# Patient Record
Sex: FEMALE | Race: WHITE
[De-identification: names, ages, dates, MRNs, and addresses within clinical notes are randomized per-mention and may not be internally consistent; named-entity substitution may affect disease eponyms.]

---

## 2019-11-05 ENCOUNTER — HOSPITAL ENCOUNTER (OUTPATIENT)
Dept: HOSPITAL 74 - FASU | Age: 24
Discharge: HOME | End: 2019-11-05
Attending: PODIATRIST
Payer: COMMERCIAL

## 2019-11-05 VITALS — SYSTOLIC BLOOD PRESSURE: 117 MMHG | DIASTOLIC BLOOD PRESSURE: 72 MMHG | HEART RATE: 75 BPM

## 2019-11-05 VITALS — TEMPERATURE: 97.9 F

## 2019-11-05 VITALS — BODY MASS INDEX: 20.7 KG/M2

## 2019-11-05 DIAGNOSIS — M21.612: ICD-10-CM

## 2019-11-05 DIAGNOSIS — M21.621: ICD-10-CM

## 2019-11-05 DIAGNOSIS — M21.622: ICD-10-CM

## 2019-11-05 DIAGNOSIS — M21.6X1: ICD-10-CM

## 2019-11-05 DIAGNOSIS — M21.611: Primary | ICD-10-CM

## 2019-11-05 PROCEDURE — 0QSP04Z REPOSITION LEFT METATARSAL WITH INTERNAL FIXATION DEVICE, OPEN APPROACH: ICD-10-PCS | Performed by: PODIATRIST

## 2019-11-05 PROCEDURE — 0QSN04Z REPOSITION RIGHT METATARSAL WITH INTERNAL FIXATION DEVICE, OPEN APPROACH: ICD-10-PCS | Performed by: PODIATRIST

## 2019-11-07 NOTE — OP
DATE OF OPERATION:  11/05/2019

 

SURGEON:  Levar Powers DPM

 

PREOPERATIVE DIAGNOSIS:  Bilateral bunions, bilateral tailor's bunion and plantar

flexed metatarsal number 2, right. 

 

POSTOPERATIVE DIAGNOSIS:  Bilateral bunions, bilateral tailor's bunion and plantar

flexed metatarsal number 2, right.

 

PROCEDURES PERFORMED:  Clyde bunionectomy, right; Clyde and Pedro Pablo bunionectomy,

left; tailor's bunionectomy, bilateral; and 2nd metatarsal Weil osteotomy, right.  

 

COMPLICATIONS:  None.

 

ESTIMATED BLOOD LOSS:  Minimal.  An ankle tourniquet was utilized. 

 

DESCRIPTION OF PROCEDURE:  The patient was prepped and draped in the usual sterile

manner.  Attention was first given to the right foot, where, under tourniquet,

attention was directed to the 1st MPJ.  A medial incision approach was made.  It was

carried down to the level of the joint capsule, carefully preserving neurovascular

structures, clamping and ligating or Bovieing bleeders as necessary.  A linear

capsulotomy was performed.  The capsular and ligamentous structures attached to the

head of the 1st metatarsal were dissected free.

 

The joint was inspected.  The hypertrophic dorsomedial eminence was removed with the

sagittal saw.  A V-shaped osteotomy was performed in the metatarsal metaphyseal

region.  The capital fragment was translocated laterally and held in place with a

K-wire.  Intraoperative fluoroscopy indicated good positioning.  The fragment was

then fixated with a single 2.4 screw.  

 

Following this, attention was directed to the 2nd metatarsophalangeal joint of the

right foot, where a Weil osteotomy was performed using a dorsal incision and again,

through all procedures, carefully preserving neurovascular structures and Bovieing

bleeders where needed.  

 

Following the linear capsulotomy of the dorsal joint, the Weil osteotomy was

performed with sagittal saw.  The capital fragment was translocated proximally and

fixated with a screw.  It was first K-wired and positioned and under fluoroscopy

noted to sit in a corrected position pre and post, as were the bunion and other

procedures throughout the procedure.  Fluoroscopy was used for all procedures.  

 

Attention was then directed to the 5th metatarsophalangeal joint, where a collateral

incision was performed, carried down to the level of the joint capsule.  Capsular

incision was performed.  The hypertrophic bone was excised.  It was confirmed.  It

was rasped smooth as well.  Again, with the bunion and this procedure, all excess

bone was rongeuered and rasped smooth.  The excess bone from the Weil was also

removed with a rongeur.  There was noted to be excellent correction.

 

Now all closure was performed with 2-0 Vicryl for capsule.  This was after _____

carefully and thoroughly flushing with sterile saline.  Deep closure subcutaneous was

then performed with 4-0 Vicryl, and skin with 5-0 nylon.  This was for all locations

on the foot.

 

Attention then was directed to the left foot, where the bunionectomy and tailor's

bunionectomy were performed, the same as was performed on the right foot. 

Additionally, the hallux was more abducted and an Pedro Pablo osteotomy was performed in

this area, with the hinge left intact, using the sagittal saw and a 10-mm staple in

position and flush with the bone was put in place.  Excellent correction was

achieved.  Again, fluoroscopy concurred with these findings.  

 

A postoperative block was given on both feet.  

 

The patient tolerated the procedures and anesthesia well, and left the operating room

with vascular status intact to all digits and sterile bandages applied.  

 

The patient was seen in Recovery and I also spoke with the patient this morning, a

day later, and the patient was doing very well.  

 

 

JOSLYN DUNCAN/[id number]

DD: 11/06/2019 11:12

DT: 11/07/2019 07:47

Job #:  [id number]

## 2019-11-07 NOTE — PATH
Surgical Pathology Report



Patient Name:  IVIS CARTER

Accession #:  [id number]

Med. Rec. #:  [id number]                                                        

   /Age/Gender:  1995 (Age: 24) / F

Account:  [id number]                                                          

             Location: [facility] AMBUL[facility] 

Taken:  2019

Received:  2019

Reported:  2019

Physicians:  Levar Powers M.D.

  



Specimen(s) Received

 BONE BILATERAL FEET BUNION 





Clinical History

Bunion bilateral feet







Final Diagnosis

BONE, BILATERAL FEET BUNIONS, EXCISION:

FRAGMENTS OF BONE WITH FATTY MARROW SHOWING FOCAL DEGENERATIVE CHANGE.





***Electronically Signed***

Douglas Ku M.D.





Gross Description

Received in formalin labeled "bone bilateral feet bunions," are 5 tan-yellow,

irregular portions of bone ranging from 0.7 x 0.5 x 0.1 cm to 1.7 x 1.4 x 0.2

cm. Representative sections are submitted in one cassette, following

decalcification.

/2019



saudi

## 2020-10-31 ENCOUNTER — TRANSCRIPTION ENCOUNTER (OUTPATIENT)
Age: 25
End: 2020-10-31

## 2021-07-02 ENCOUNTER — TRANSCRIPTION ENCOUNTER (OUTPATIENT)
Age: 26
End: 2021-07-02

## 2021-12-22 ENCOUNTER — TRANSCRIPTION ENCOUNTER (OUTPATIENT)
Age: 26
End: 2021-12-22

## 2024-02-08 PROBLEM — Z00.00 ENCOUNTER FOR PREVENTIVE HEALTH EXAMINATION: Status: ACTIVE | Noted: 2024-02-08

## 2024-03-08 ENCOUNTER — APPOINTMENT (OUTPATIENT)
Dept: PEDIATRIC MEDICAL GENETICS | Facility: CLINIC | Age: 29
End: 2024-03-08
Payer: COMMERCIAL

## 2024-03-08 DIAGNOSIS — Z78.9 OTHER SPECIFIED HEALTH STATUS: ICD-10-CM

## 2024-03-08 DIAGNOSIS — Z83.42 FAMILY HISTORY OF FAMILIAL HYPERCHOLESTEROLEMIA: ICD-10-CM

## 2024-03-08 DIAGNOSIS — Z13.71 ENCOUNTER FOR NONPROCREATIVE SCREENING FOR GENETIC DISEASE CARRIER STATUS: ICD-10-CM

## 2024-03-08 DIAGNOSIS — Z80.42 FAMILY HISTORY OF MALIGNANT NEOPLASM OF PROSTATE: ICD-10-CM

## 2024-03-08 DIAGNOSIS — Z31.5 ENCOUNTER FOR PROCREATIVE GENETIC COUNSELING: ICD-10-CM

## 2024-03-08 PROCEDURE — 96040: CPT | Mod: 95

## 2024-03-08 RX ORDER — ELASTIC BANDAGE 2"X2.2YD
BANDAGE TOPICAL
Refills: 0 | Status: ACTIVE | COMMUNITY

## 2024-03-09 ENCOUNTER — NON-APPOINTMENT (OUTPATIENT)
Age: 29
End: 2024-03-09

## 2024-03-09 ENCOUNTER — TRANSCRIPTION ENCOUNTER (OUTPATIENT)
Age: 29
End: 2024-03-09

## 2024-04-01 ENCOUNTER — NON-APPOINTMENT (OUTPATIENT)
Age: 29
End: 2024-04-01

## 2024-08-09 ENCOUNTER — APPOINTMENT (OUTPATIENT)
Dept: MATERNAL FETAL MEDICINE | Facility: CLINIC | Age: 29
End: 2024-08-09

## 2024-08-09 ENCOUNTER — ASOB RESULT (OUTPATIENT)
Age: 29
End: 2024-08-09

## 2024-08-09 PROBLEM — O24.419 GESTATIONAL DIABETES: Status: ACTIVE | Noted: 2024-08-09

## 2024-08-09 PROCEDURE — G0109 DIAB MANAGE TRN IND/GROUP: CPT | Mod: 95

## 2024-08-19 ENCOUNTER — ASOB RESULT (OUTPATIENT)
Age: 29
End: 2024-08-19

## 2024-08-19 ENCOUNTER — APPOINTMENT (OUTPATIENT)
Dept: MATERNAL FETAL MEDICINE | Facility: CLINIC | Age: 29
End: 2024-08-19
Payer: COMMERCIAL

## 2024-08-19 PROCEDURE — G0108 DIAB MANAGE TRN  PER INDIV: CPT | Mod: 95

## 2024-08-30 ENCOUNTER — APPOINTMENT (OUTPATIENT)
Dept: MATERNAL FETAL MEDICINE | Facility: CLINIC | Age: 29
End: 2024-08-30
Payer: COMMERCIAL

## 2024-08-30 ENCOUNTER — ASOB RESULT (OUTPATIENT)
Age: 29
End: 2024-08-30

## 2024-08-30 PROCEDURE — G0108 DIAB MANAGE TRN  PER INDIV: CPT | Mod: 95

## 2024-09-09 ENCOUNTER — TRANSCRIPTION ENCOUNTER (OUTPATIENT)
Age: 29
End: 2024-09-09

## 2024-09-09 ENCOUNTER — INPATIENT (INPATIENT)
Facility: HOSPITAL | Age: 29
LOS: 1 days | Discharge: HOME | End: 2024-09-11
Attending: OBSTETRICS & GYNECOLOGY | Admitting: OBSTETRICS & GYNECOLOGY

## 2024-09-09 ENCOUNTER — ASOB RESULT (OUTPATIENT)
Age: 29
End: 2024-09-09

## 2024-09-09 ENCOUNTER — APPOINTMENT (OUTPATIENT)
Dept: ANTEPARTUM | Facility: CLINIC | Age: 29
End: 2024-09-09
Payer: COMMERCIAL

## 2024-09-09 VITALS
RESPIRATION RATE: 15 BRPM | HEART RATE: 81 BPM | DIASTOLIC BLOOD PRESSURE: 84 MMHG | SYSTOLIC BLOOD PRESSURE: 137 MMHG | TEMPERATURE: 99 F

## 2024-09-09 DIAGNOSIS — Z98.890 OTHER SPECIFIED POSTPROCEDURAL STATES: Chronic | ICD-10-CM

## 2024-09-09 DIAGNOSIS — O26.899 OTHER SPECIFIED PREGNANCY RELATED CONDITIONS, UNSPECIFIED TRIMESTER: ICD-10-CM

## 2024-09-09 DIAGNOSIS — O42.90 PREMATURE RUPTURE OF MEMBRANES, UNSPECIFIED AS TO LENGTH OF TIME BETWEEN RUPTURE AND ONSET OF LABOR, UNSPECIFIED WEEKS OF GESTATION: ICD-10-CM

## 2024-09-09 LAB
BASOPHILS # BLD AUTO: 0.03 K/UL — SIGNIFICANT CHANGE UP (ref 0–0.2)
BASOPHILS NFR BLD AUTO: 0.2 % — SIGNIFICANT CHANGE UP (ref 0–2)
BLD GP AB SCN SERPL QL: NEGATIVE — SIGNIFICANT CHANGE UP
EOSINOPHIL # BLD AUTO: 0.03 K/UL — SIGNIFICANT CHANGE UP (ref 0–0.5)
EOSINOPHIL NFR BLD AUTO: 0.2 % — SIGNIFICANT CHANGE UP (ref 0–6)
GLUCOSE BLDC GLUCOMTR-MCNC: 76 MG/DL — SIGNIFICANT CHANGE UP (ref 70–99)
GLUCOSE BLDC GLUCOMTR-MCNC: 89 MG/DL — SIGNIFICANT CHANGE UP (ref 70–99)
HCT VFR BLD CALC: 40 % — SIGNIFICANT CHANGE UP (ref 34.5–45)
HGB BLD-MCNC: 13.5 G/DL — SIGNIFICANT CHANGE UP (ref 11.5–15.5)
IANC: 10.4 K/UL — HIGH (ref 1.8–7.4)
IMM GRANULOCYTES NFR BLD AUTO: 0.5 % — SIGNIFICANT CHANGE UP (ref 0–0.9)
LYMPHOCYTES # BLD AUTO: 1.74 K/UL — SIGNIFICANT CHANGE UP (ref 1–3.3)
LYMPHOCYTES # BLD AUTO: 13.2 % — SIGNIFICANT CHANGE UP (ref 13–44)
MCHC RBC-ENTMCNC: 29.2 PG — SIGNIFICANT CHANGE UP (ref 27–34)
MCHC RBC-ENTMCNC: 33.8 GM/DL — SIGNIFICANT CHANGE UP (ref 32–36)
MCV RBC AUTO: 86.6 FL — SIGNIFICANT CHANGE UP (ref 80–100)
MONOCYTES # BLD AUTO: 0.9 K/UL — SIGNIFICANT CHANGE UP (ref 0–0.9)
MONOCYTES NFR BLD AUTO: 6.8 % — SIGNIFICANT CHANGE UP (ref 2–14)
NEUTROPHILS # BLD AUTO: 10.4 K/UL — HIGH (ref 1.8–7.4)
NEUTROPHILS NFR BLD AUTO: 79.1 % — HIGH (ref 43–77)
NRBC # BLD: 0 /100 WBCS — SIGNIFICANT CHANGE UP (ref 0–0)
NRBC # FLD: 0 K/UL — SIGNIFICANT CHANGE UP (ref 0–0)
PLATELET # BLD AUTO: 150 K/UL — SIGNIFICANT CHANGE UP (ref 150–400)
RBC # BLD: 4.62 M/UL — SIGNIFICANT CHANGE UP (ref 3.8–5.2)
RBC # FLD: 13.4 % — SIGNIFICANT CHANGE UP (ref 10.3–14.5)
RH IG SCN BLD-IMP: POSITIVE — SIGNIFICANT CHANGE UP
RH IG SCN BLD-IMP: POSITIVE — SIGNIFICANT CHANGE UP
WBC # BLD: 13.16 K/UL — HIGH (ref 3.8–10.5)
WBC # FLD AUTO: 13.16 K/UL — HIGH (ref 3.8–10.5)

## 2024-09-09 PROCEDURE — 76815 OB US LIMITED FETUS(S): CPT | Mod: 26

## 2024-09-09 RX ORDER — KETOROLAC TROMETHAMINE 30 MG/ML
30 INJECTION, SOLUTION INTRAMUSCULAR ONCE
Refills: 0 | Status: DISCONTINUED | OUTPATIENT
Start: 2024-09-09 | End: 2024-09-10

## 2024-09-09 RX ORDER — CHLORHEXIDINE GLUCONATE 40 MG/ML
1 SOLUTION TOPICAL DAILY
Refills: 0 | Status: DISCONTINUED | OUTPATIENT
Start: 2024-09-09 | End: 2024-09-10

## 2024-09-09 RX ORDER — VITAMIN A, ASCORBIC ACID, VITAMIN D, .ALPHA.-TOCOPHEROL, THIAMINE MONONITRATE, RIBOFLAVIN, NIACIN, PYRIDOXINE HYDROCHLORIDE, FOLIC ACID, CYANOCOBALAMIN, CALCIUM, IRON, MAGNESIUM, ZINC, AND COPPER 2700; 70; 400; 30; 1.6; 1.8; 18; 2.5; 1; 12; 100; 65; 25; 25; 2 [IU]/1; MG/1; [IU]/1; [IU]/1; MG/1; MG/1; MG/1; MG/1; MG/1; UG/1; MG/1; MG/1; MG/1; MG/1; MG/1
1 TABLET ORAL DAILY
Refills: 0 | Status: DISCONTINUED | OUTPATIENT
Start: 2024-09-09 | End: 2024-09-11

## 2024-09-09 RX ORDER — SODIUM CHLORIDE 9 MG/ML
3 INJECTION INTRAMUSCULAR; INTRAVENOUS; SUBCUTANEOUS EVERY 8 HOURS
Refills: 0 | Status: DISCONTINUED | OUTPATIENT
Start: 2024-09-09 | End: 2024-09-11

## 2024-09-09 RX ORDER — WITCH HAZEL 1 G/ML
1 LIQUID TOPICAL EVERY 4 HOURS
Refills: 0 | Status: DISCONTINUED | OUTPATIENT
Start: 2024-09-09 | End: 2024-09-11

## 2024-09-09 RX ORDER — PRAMOXINE HCL 1 %
1 GEL (GRAM) TOPICAL EVERY 4 HOURS
Refills: 0 | Status: DISCONTINUED | OUTPATIENT
Start: 2024-09-09 | End: 2024-09-11

## 2024-09-09 RX ORDER — TETANUS TOXOID, REDUCED DIPHTHERIA TOXOID AND ACELLULAR PERTUSSIS VACCINE, ADSORBED 5; 2.5; 8; 8; 2.5 [IU]/.5ML; [IU]/.5ML; UG/.5ML; UG/.5ML; UG/.5ML
0.5 SUSPENSION INTRAMUSCULAR ONCE
Refills: 0 | Status: DISCONTINUED | OUTPATIENT
Start: 2024-09-09 | End: 2024-09-11

## 2024-09-09 RX ORDER — IBUPROFEN 600 MG
600 TABLET ORAL EVERY 6 HOURS
Refills: 0 | Status: COMPLETED | OUTPATIENT
Start: 2024-09-09 | End: 2025-08-08

## 2024-09-09 RX ORDER — ACETAMINOPHEN 325 MG/1
975 TABLET ORAL
Refills: 0 | Status: DISCONTINUED | OUTPATIENT
Start: 2024-09-09 | End: 2024-09-11

## 2024-09-09 RX ORDER — OXYTOCIN 10 UNIT/ML
41.67 AMPUL (ML) INJECTION
Qty: 20 | Refills: 0 | Status: DISCONTINUED | OUTPATIENT
Start: 2024-09-09 | End: 2024-09-10

## 2024-09-09 RX ORDER — OXYTOCIN 10 UNIT/ML
333.33 AMPUL (ML) INJECTION
Qty: 20 | Refills: 0 | Status: DISCONTINUED | OUTPATIENT
Start: 2024-09-09 | End: 2024-09-10

## 2024-09-09 RX ORDER — MENTHOL/CETYLPYRD CL 3 MG
1 LOZENGE MUCOUS MEMBRANE EVERY 6 HOURS
Refills: 0 | Status: DISCONTINUED | OUTPATIENT
Start: 2024-09-09 | End: 2024-09-11

## 2024-09-09 RX ORDER — LANOLIN
1 OINTMENT (GRAM) TOPICAL EVERY 6 HOURS
Refills: 0 | Status: DISCONTINUED | OUTPATIENT
Start: 2024-09-09 | End: 2024-09-11

## 2024-09-09 RX ORDER — DIPHENHYDRAMINE HCL 50 MG
25 CAPSULE ORAL EVERY 6 HOURS
Refills: 0 | Status: DISCONTINUED | OUTPATIENT
Start: 2024-09-09 | End: 2024-09-11

## 2024-09-09 RX ORDER — OXYCODONE HYDROCHLORIDE 5 MG/1
5 TABLET ORAL ONCE
Refills: 0 | Status: DISCONTINUED | OUTPATIENT
Start: 2024-09-09 | End: 2024-09-11

## 2024-09-09 RX ORDER — HYDROCORTISONE 1 %
1 OINTMENT (GRAM) TOPICAL EVERY 6 HOURS
Refills: 0 | Status: DISCONTINUED | OUTPATIENT
Start: 2024-09-09 | End: 2024-09-11

## 2024-09-09 RX ORDER — OXYTOCIN 10 UNIT/ML
AMPUL (ML) INJECTION
Qty: 30 | Refills: 0 | Status: DISCONTINUED | OUTPATIENT
Start: 2024-09-09 | End: 2024-09-09

## 2024-09-09 RX ORDER — OXYCODONE HYDROCHLORIDE 5 MG/1
5 TABLET ORAL
Refills: 0 | Status: DISCONTINUED | OUTPATIENT
Start: 2024-09-09 | End: 2024-09-11

## 2024-09-09 RX ADMIN — Medication 125 MILLILITER(S): at 19:30

## 2024-09-09 RX ADMIN — Medication 2 MILLIUNIT(S)/MIN: at 19:30

## 2024-09-09 RX ADMIN — Medication 125 MILLILITER(S): at 18:41

## 2024-09-09 RX ADMIN — Medication 2 MILLIUNIT(S)/MIN: at 18:45

## 2024-09-09 RX ADMIN — CHLORHEXIDINE GLUCONATE 1 APPLICATION(S): 40 SOLUTION TOPICAL at 18:40

## 2024-09-09 NOTE — OB PROVIDER H&P - NSLOWPPHRISK_OBGYN_A_OB
No previous uterine incision/Chaney Pregnancy/Less than or equal to 4 previous vaginal births/No known bleeding disorder/No history of postpartum hemorrhage/No other PPH risks indicated

## 2024-09-09 NOTE — OB PROVIDER H&P - HISTORY OF PRESENT ILLNESS
28y/o  @39.1wks gestation presents with contraction pain 7/10 pain scale and leaking of clear fluid since 1am.  Denies vaginal bleeding.  Endorses +FM     PNC: Dr. Mobley- low risk   -GBS Negative 2024  -Late diagnosis of GDMA 1(fingersticks have been wnl)

## 2024-09-09 NOTE — OB PROVIDER TRIAGE NOTE - NSICDXPASTMEDICALHX_GEN_ALL_CORE_FT
PAST MEDICAL HISTORY:  History of bunionectomy     History of nasal septoplasty      PAST MEDICAL HISTORY:  No pertinent past medical history

## 2024-09-09 NOTE — OB PROVIDER TRIAGE NOTE - NSOBPROVIDERNOTE_OBGYN_ALL_OB_FT
28y/o  @39.1wks gestation admit for premature rupture of membranes     -Admit to L&D   -Routine labs   -IV fluids  -Epidural PRN   -For Pitocin   Blood transfusion and induction/labor consents obtained. Risks, benefits, alternatives and possible complications have been discussed in detail with the patient in her native language. Pre-admission, admission, and post admission procedures and expectations were discussed in detail. All questions answered, all appropriate hospital consents were signed. Anticipate normal vaginal delivery. Informed Consent was obtained. The following was discussed:     Induction/Augmentation of Labor: Use of medication to begin or enhance labor  Obstetrical management including internal fetal/contraction monitoring  Normal vaginal delivery  Possible  Section: (surgical cut in the abdomen in order to deliver the baby)    D/W Dr. Chery   D/W Dr. Zeb Bae, NP

## 2024-09-09 NOTE — OB RN DELIVERY SUMMARY - NS_PLACENTDISPOA_OBGYN_ALL_OB
1930: Verbal shift change report given to [address] [place] [address] (oncoming nurse) by Judi Pisano, RN (offgoing nurse). Report included the following information SBAR, Kardex, Intake/Output, MAR, Recent Results, Cardiac Rhythm NSR and Alarm Parameters . 0500: Notified Rk Doran NP of patients potassium of 6.1, Magnesium of 3.0, and Phosphorus of 4.8    0540: RN x 2 at bedside attempting to perform incontinent care. Upon rolling the patient, o2 sats dropped into the 30's; Patient became bradycardic with HR in the 30's and hypotensive with SBP in the 30's. Rk Doran NP at bedside. 0730: Bedside and Verbal shift change report given to Kevon Jolley RN (oncoming nurse) by Judi Pisano RN (offgoing nurse). Report included the following information SBAR, Kardex, Intake/Output, MAR, Recent Results, Cardiac Rhythm NSR/SB and Alarm Parameters . Discarded in the usual manner

## 2024-09-09 NOTE — OB RN DELIVERY SUMMARY - NSSELHIDDEN_OBGYN_ALL_OB_FT
[NS_DeliveryAttending1_OBGYN_ALL_OB_FT:[id number]==],[NS_DeliveryRN_OBGYN_ALL_OB_FT:[id number]],[NS_CirculateRN2_OBGYN_ALL_OB_FT:[id number]=]

## 2024-09-09 NOTE — OB PROVIDER TRIAGE NOTE - NSHPPHYSICALEXAM_GEN_ALL_CORE
T(C): 37.3 (09-09-24 @ 15:04), Max: 37.3 (09-09-24 @ 15:04)  HR: 100 (09-09-24 @ 16:37) (81 - 100)  BP: 130/77 (09-09-24 @ 16:37) (121/66 - 137/84)  RR: 15 (09-09-24 @ 15:04) (15 - 15)    Physical Exam  Gen: NAD  Cardiac: RRR  Pulm: Unlabored, breathing comfortably on room air  Abdomen: soft, nontender, gravid    TAUS: cephalic, Posterior placenta, MVP: 2.74,  bpm via m-mode, images saved in ASOB  SSE: Positive pooling, Positive Nitrazine, Positive Fern, Cervix appears dilated   VE:2.5/60/-3  EFM:  Level Green:

## 2024-09-09 NOTE — OB PROVIDER TRIAGE NOTE - HISTORY OF PRESENT ILLNESS
30y/o  @39.1wks gestation presents with contraction pain 7/10 pain scale and leaking of clear fluid since 1am.  Denies vaginal bleeding.  Endorses +FM     PNC: Dr. Mobley- low risk   -GBS Negative 2024  -Late diagnosis of GDMA 1(fingersticks have been wnl)

## 2024-09-09 NOTE — OB PROVIDER IHI INDUCTION/AUGMENTATION NOTE - LABOR: FETAL STATION
Psychiatric Assessment    Patient: Levon Snowden Date: 2024   : 1982 Attending: No att. providers found   41 year old female        This visit is being performed virtually via Video visit using [employer].   Clinical Location: Home  Levon's location Home and is physically present in   the [facility] at the time of this visit.        Chief complaint: \"I feel frustrated that I can't work.\"    History of Presenting Illness: Ms. Snowden is a 41 year old -American female who is seen today for her initial psychiatric evaluation with this provider. She reports history of depression and anxiety for which she is currently taking Zoloft, Buspar and Hydroxyzine as needed. Denies having any significant side effects from her medications. She feels \"frustrated\" about her inability to work due to leg injury and surgery with another pending procedure. She reportedly loves to work but has been prevented by injury in the past years. She moved to [place] 5 years ago from [place] where she grew up. Reports sleeping \"well\" with the help of her medications. Mood is \"better but still depressed.\" She enjoys watching football. Reports crying spells but vehemently denied suicidal thoughts or intent. Appetite, concentration and energy levels are decreased.  Denies psychomotor agitation or retardation. Denies history of auditory or visual hallucinations.    Patient denies history of inpatient psychiatric hospitalizations. Denies past suicide attempts or self-injurious behaviors. Denies any significant childhood abuse or trauma. Reports that depressions \"runs in my family.\" Denies alcohol or illicit substance abuse.    Past Psych History: As noted in the history of present illness including AODA loss of control and trauma.       Psychiatric ROS:   Bipolar disorder: The patient denies having grandiose mood, elevated energy, being awake for extended periods of time without the need for sleep, excessive talking,  impulsive behavior, hypersexual behavior, racing thoughts and mood swings.   Obsessive compulsive: The patient denies having repetitive thoughts, repetitive visions, hoarding behavior, repetitive checking and uses of rituals behaviors.   Post-traumatic stress disorder: The patient denies having trauma that has caused nightmares, hypervigilant behavior, emotional numbing, flashbacks, avoidant behavior, anger, and dissociation.   Panic attacks/Generalized anxiety disorder: The patient reports having nervousness, constant worrying, muscle tension, anger, key up, problems with sleep caused by anxiety, shortness of breath, palpitations, chest tightness, hot/cold flashes, sweating, shakiness, and feeling of impending doom.     Past Medical and Current Status:   The following were reviewed in the electronic record as past history and active problems:  Active Ambulatory Problems     Diagnosis Date Noted    Anxiety 05/10/2021    Obesity (BMI 30-39.9) 05/10/2021    Tobacco use 05/10/2021    Prediabetes 05/11/2021    Low vitamin D level 05/11/2021    History of iron deficiency anemia 07/21/2022    Neuralgia of left saphenous nerve 11/28/2022    Neuropathic pain 11/28/2022    Seizure-like activity (CMD) 10/18/2023     Resolved Ambulatory Problems     Diagnosis Date Noted    No Resolved Ambulatory Problems     Past Medical History:   Diagnosis Date    Asthma     Back pain     Panic attacks      Patient Active Problem List   Diagnosis    Anxiety    Obesity (BMI 30-39.9)    Tobacco use    Prediabetes    Low vitamin D level    History of iron deficiency anemia    Neuralgia of left saphenous nerve    Neuropathic pain    Seizure-like activity (CMD)       Medications at Assessment: Prior to assessment medications were reviewed in the electronic record.  Current Outpatient Medications on File Prior to Visit   Medication Sig Dispense Refill    hydrOXYzine (ATARAX) 25 MG tablet Take 1 tablet by mouth every 8 hours as needed for Anxiety.  Do not drive or drink alcohol as the medication can cause sedation. 30 tablet 0    [DISCONTINUED] busPIRone (BUSPAR) 7.5 MG tablet Take 1 tablet by mouth in the morning and 1 tablet in the evening. NO FURTHER REFILLS UNTIL SEEN BY  tablet 1    [DISCONTINUED] sertraline (ZOLOFT) 100 MG tablet Take 1 tablet by mouth daily. 90 tablet 1    solifenacin (VESICARE) 10 MG tablet Take 1 tablet (10 mg total) by mouth once daily. 90 tablet 3     Current Facility-Administered Medications on File Prior to Visit   Medication    levonorgestrel (MIRENA) (52 MG) 20 MCG/DAY intrauterine device 52 mg       Family History (Psych and pertinent Med):  Reviewed and updated in the electronic record.  Family diseases/traits and treatment: See hx.    Social History:    Social History     Tobacco Use    Smoking status: Every Day     Current packs/day: 0.50     Average packs/day: 0.5 packs/day for 0.6 years (0.3 ttl pk-yrs)     Types: Cigarettes     Start date: 6/1/2023    Smokeless tobacco: Never    Tobacco comments:     1/2 pk per day. Patient has a longer smoking history. She had quit but re-started in June 2023. Quit smoking 09/16/2023, vaping   Vaping Use    Vaping Use: never used   Substance Use Topics    Alcohol use: Not Currently     Comment: Social/occasional    Drug use: Yes     Frequency: 14.0 times per week     Types: Marijuana     Comment: uses for panic attacks and pain     None/No Preference  Living Condition:Lives with her BF and her daughter  Social and Sexual History reviewed with the patient and updated in the electronic record.    Mental Status Exam:  Levon Snowden is a 41 year old Black/  female who presents today for a psychiatric evaluation.  Patient appears stated age. Patient is alert and fully oriented. No abnormal involuntary movements or gait issues noted. Speech is of normal rate, tone and volume. Thought process is free of any thought blocking or perseveration. Thought content logical and  coherent with no delusions. Patient’s perception did not reveal hallucinations. Attention and concentration are unremarkable. Remote and immediate memories are intact. Insight and judgment are fair. Fund of knowledge is average. Patient denies suicidal or homicidal ideations.    Inventory of Assets: Appears motivated to follow treatment plan    Diagnosis: Moderate episode of recurrent major depressive disorder (CMD)  (primary encounter diagnosis)  Adjustment disorder with anxious mood    Medical Decision Making/Plan of Treatment:  We reviewed patient's diagnosis and treatment options including psychotherapy.  She will continue her medications as listed below. Zoloft and Buspar were increased to 150 mg and 15 mg respectively. She will also continue taking her Hydroxyzine as prescribed. Indications and common side effects of the medications were reviewed. She will RTC in 2-3 months for follow up and understands to call with questions or concerns in the interim.  Orders Placed This Encounter    busPIRone (BUSPAR) 15 MG tablet     Sig: Take 1 tablet by mouth in the morning and 1 tablet in the evening. NO FURTHER REFILLS UNTIL SEEN BY PCP     Dispense:  180 tablet     Refill:  0    sertraline (ZOLOFT) 100 MG tablet     Sig: Take 1.5 tablets by mouth daily.     Dispense:  135 tablet     Refill:  0     This information is confidential and disclosure without patient consent or statutory authorization is prohibited by law.    Robert Cage, GABE, PMLUP-BC   -3

## 2024-09-09 NOTE — OB RN DELIVERY SUMMARY - NS_SEPSISRSKCALC_OBGYN_ALL_OB_FT
EOS calculated successfully. EOS Risk Factor: 0.5/1000 live births ([facility] national incidence); GA=39w1d; Temp=99.1; ROM=21.85; GBS='Negative'; Antibiotics='No antibiotics or any antibiotics < 2 hrs prior to birth'

## 2024-09-09 NOTE — OB RN TRIAGE NOTE - NS_TRIAGEINITIALRNASSESSMENT_OBGYN_ALL_OB_FT
Nutrition Care Plan    Nutrition Diagnosis:  Increased nutrient needs  related to demands of prematurity as evidenced by EGA 28-2/7wks & estimated nutrient needs.    Nutrition Prescription:  Energy Goal: 115-125cal/kg/day (yesterdays intake 129cal/kg/day; to breast x 2)  Fluid Goal: 140-155ml/kg/day  Protein Goal: 3.8-4.2g/kg/day (yesterdays intake 4.7g/kg/day)  Ca Goal: 120-230 mg Ca/kg/day  P Goal:  mg P/kg/day   Iron Goal: 2-4mg/kg/day (yesterdays intake 3.2mg/kg/day)  Vitamin D Goal: 400 International Units/day  Growth Goal: Gain 18-22g/kg/day (18.2g/kg/day avg this past wk), 0.7-1 cm in length/week; 0.5-1 cm in OFC/week    Intervention:  Modified diet:    Receiving 24 brayan/oz feeds of fortified human milk with 3ml's liquid protein fortifier per 100ml's; continue to advance as tolerated & work on nippling/breast feeds.      Monitoring and Evaluation:  Breast milk intake:   Goal intake 115-125cal/kg/day from fortified human milk &/or  formula. Tolerating NG feeds over 45 mins (all NG feeds except for 10ml's & to breast x 2). 2L HFNC 27%. Liquid protein fortifier added  at 1330.    Desired growth pattern:   Goal to gain 18-22g/kg/day with a goal wt of 2200g by 21. Wt gain avg this past wk within goal. +0.11 change in Z-score since birth compared to +0.12 last wk.        Dolly DUBON

## 2024-09-09 NOTE — OB PROVIDER H&P - NSHPPHYSICALEXAM_GEN_ALL_CORE
T(C): 37.3 (09-09-24 @ 15:04), Max: 37.3 (09-09-24 @ 15:04)  HR: 100 (09-09-24 @ 16:37) (81 - 100)  BP: 130/77 (09-09-24 @ 16:37) (121/66 - 137/84)  RR: 15 (09-09-24 @ 15:04) (15 - 15)    Physical Exam  Gen: NAD  Cardiac: RRR  Pulm: Unlabored, breathing comfortably on room air  Abdomen: soft, nontender, gravid    TAUS: cephalic, Posterior placenta, MVP: 2.74,  bpm via m-mode, images saved in ASOB  SSE: Positive pooling, Positive Nitrazine, Positive Fern, Cervix appears dilated   VE:2.5/60/-3  EFM: 130bpm/moderate variability/+accels, No decels/Reactive NST  [place]: Q4-5min T(C): 37.3 (09-09-24 @ 15:04), Max: 37.3 (09-09-24 @ 15:04)  HR: 100 (09-09-24 @ 16:37) (81 - 100)  BP: 130/77 (09-09-24 @ 16:37) (121/66 - 137/84)  RR: 15 (09-09-24 @ 15:04) (15 - 15)    Physical Exam  Gen: NAD  Cardiac: RRR  Pulm: Unlabored, breathing comfortably on room air  Abdomen: soft, nontender, gravid    TAUS: cephalic, Posterior placenta, MVP: 2.74,  bpm via m-mode, images saved in ASOB  SSE: Positive pooling, Positive Nitrazine, Positive Fern, Cervix appears dilated   VE:2.5/60/-3  EFM: 130bpm/moderate variability/ +accels, No decels/ Reactive NST  [place]: Q4-5min

## 2024-09-09 NOTE — OB PROVIDER H&P - NS_FETALMONCTX30_OBGYN_ALL_OB
Requesting refill of lisdexafetamine    Last Rx written: 7/1/21  Last Rx dispensed (per PDMP): 7/1/21    Last office visit: 6/17/21  Upcoming office visit: 12/1721    Rx prepped, routed to provider for signature.           Less than or equal to 5 Contractions in 30 minutes

## 2024-09-10 LAB — T PALLIDUM AB TITR SER: NEGATIVE — SIGNIFICANT CHANGE UP

## 2024-09-10 RX ORDER — VITAMIN A, ASCORBIC ACID, VITAMIN D, .ALPHA.-TOCOPHEROL, THIAMINE MONONITRATE, RIBOFLAVIN, NIACIN, PYRIDOXINE HYDROCHLORIDE, FOLIC ACID, CYANOCOBALAMIN, CALCIUM, IRON, MAGNESIUM, ZINC, AND COPPER 2700; 70; 400; 30; 1.6; 1.8; 18; 2.5; 1; 12; 100; 65; 25; 25; 2 [IU]/1; MG/1; [IU]/1; [IU]/1; MG/1; MG/1; MG/1; MG/1; MG/1; UG/1; MG/1; MG/1; MG/1; MG/1; MG/1
1 TABLET ORAL
Qty: 0 | Refills: 0 | DISCHARGE

## 2024-09-10 RX ORDER — ACETAMINOPHEN 325 MG/1
3 TABLET ORAL
Qty: 0 | Refills: 0 | DISCHARGE
Start: 2024-09-10

## 2024-09-10 RX ORDER — IBUPROFEN 600 MG
1 TABLET ORAL
Qty: 0 | Refills: 0 | DISCHARGE
Start: 2024-09-10

## 2024-09-10 RX ORDER — IBUPROFEN 600 MG
600 TABLET ORAL EVERY 6 HOURS
Refills: 0 | Status: DISCONTINUED | OUTPATIENT
Start: 2024-09-10 | End: 2024-09-11

## 2024-09-10 RX ADMIN — Medication 600 MILLIGRAM(S): at 18:36

## 2024-09-10 RX ADMIN — KETOROLAC TROMETHAMINE 30 MILLIGRAM(S): 30 INJECTION, SOLUTION INTRAMUSCULAR at 00:47

## 2024-09-10 RX ADMIN — Medication 600 MILLIGRAM(S): at 23:48

## 2024-09-10 RX ADMIN — ACETAMINOPHEN 975 MILLIGRAM(S): 325 TABLET ORAL at 02:03

## 2024-09-10 RX ADMIN — Medication 600 MILLIGRAM(S): at 13:55

## 2024-09-10 RX ADMIN — ACETAMINOPHEN 975 MILLIGRAM(S): 325 TABLET ORAL at 10:33

## 2024-09-10 RX ADMIN — ACETAMINOPHEN 975 MILLIGRAM(S): 325 TABLET ORAL at 21:10

## 2024-09-10 RX ADMIN — SODIUM CHLORIDE 3 MILLILITER(S): 9 INJECTION INTRAMUSCULAR; INTRAVENOUS; SUBCUTANEOUS at 15:55

## 2024-09-10 RX ADMIN — VITAMIN A, ASCORBIC ACID, VITAMIN D, .ALPHA.-TOCOPHEROL, THIAMINE MONONITRATE, RIBOFLAVIN, NIACIN, PYRIDOXINE HYDROCHLORIDE, FOLIC ACID, CYANOCOBALAMIN, CALCIUM, IRON, MAGNESIUM, ZINC, AND COPPER 1 TABLET(S): 2700; 70; 400; 30; 1.6; 1.8; 18; 2.5; 1; 12; 100; 65; 25; 25; 2 TABLET ORAL at 13:01

## 2024-09-10 RX ADMIN — SODIUM CHLORIDE 3 MILLILITER(S): 9 INJECTION INTRAMUSCULAR; INTRAVENOUS; SUBCUTANEOUS at 21:10

## 2024-09-10 RX ADMIN — ACETAMINOPHEN 975 MILLIGRAM(S): 325 TABLET ORAL at 20:41

## 2024-09-10 RX ADMIN — ACETAMINOPHEN 975 MILLIGRAM(S): 325 TABLET ORAL at 02:33

## 2024-09-10 RX ADMIN — ACETAMINOPHEN 975 MILLIGRAM(S): 325 TABLET ORAL at 11:32

## 2024-09-10 RX ADMIN — Medication 125 MILLIUNIT(S)/MIN: at 00:47

## 2024-09-10 RX ADMIN — Medication 600 MILLIGRAM(S): at 13:00

## 2024-09-10 RX ADMIN — ACETAMINOPHEN 975 MILLIGRAM(S): 325 TABLET ORAL at 15:45

## 2024-09-10 RX ADMIN — SODIUM CHLORIDE 3 MILLILITER(S): 9 INJECTION INTRAMUSCULAR; INTRAVENOUS; SUBCUTANEOUS at 06:02

## 2024-09-10 RX ADMIN — Medication 600 MILLIGRAM(S): at 05:56

## 2024-09-10 RX ADMIN — ACETAMINOPHEN 975 MILLIGRAM(S): 325 TABLET ORAL at 16:28

## 2024-09-10 RX ADMIN — Medication 600 MILLIGRAM(S): at 05:26

## 2024-09-10 NOTE — DISCHARGE NOTE OB - PATIENT PORTAL LINK FT
You can access the FollowMyHealth Patient Portal offered by [facility] by registering at the following website: [email or web address]. By joining [facility]’s FollowMyHealth portal, you will also be able to view your health information using other applications (apps) compatible with our system.

## 2024-09-10 NOTE — DISCHARGE NOTE OB - MEDICATION SUMMARY - MEDICATIONS TO TAKE
22:00
I will START or STAY ON the medications listed below when I get home from the hospital:    ibuprofen 600 mg oral tablet  -- 1 tab(s) by mouth every 6 hours  -- Indication: For pain    acetaminophen 325 mg oral tablet  -- 3 tab(s) by mouth every 6 hours  -- Indication: For pain    PNV Prenatal oral tablet  -- 1 tab(s) by mouth once a day  -- Indication: For supplement

## 2024-09-10 NOTE — DISCHARGE NOTE OB - NS MD DC FALL RISK RISK
For information on Fall & Injury Prevention, visit: [email or web address] OR  [email or web address] OR  https://www.cdc.gov/steadi/patient.html

## 2024-09-10 NOTE — DISCHARGE NOTE OB - CARE PROVIDER_API CALL
Pily Garibay  Obstetrics and Gynecology  [address], [address]  [place] [address]  Phone: [phone]  Fax: [phone]  Follow Up Time:

## 2024-09-10 NOTE — DISCHARGE NOTE OB - MATERIALS PROVIDED
[facility] [place] Screening Program/[place]  Immunization Record/Breastfeeding Log/Breastfeeding Mother’s Support Group Information/Guide to Postpartum Care/[facility] Hearing Screen Program/Back To Sleep Handout/Shaken Baby Prevention Handout/Breastfeeding Guide and Packet/Birth Certificate Instructions/Discharge Medication Information for Patients and Families Pocket Guide

## 2024-09-10 NOTE — OB PROVIDER DELIVERY SUMMARY - NSSELHIDDEN_OBGYN_ALL_OB_FT
[NS_DeliveryAttending1_OBGYN_ALL_OB_FT:[id number]==],[NS_DeliveryRN_OBGYN_ALL_OB_FT:[id number]],[NS_CirculateRN2_OBGYN_ALL_OB_FT:[id number]=]

## 2024-09-10 NOTE — OB PROVIDER DELIVERY SUMMARY - NSPROVIDERDELIVERYNOTE_OBGYN_ALL_OB_FT
Pt fully dilated and pushing.  Delivered viable infant over intact perineum.  Nose and mouth bulb suctioned.  Infant handed to mother.  Cord clamped and cut after delay. Cord gases sent.  Placenta delivered spontaneously and intact.  1st degree and right labial lacerations repaired with excellent hemostasis and restoration of anatomy.  Fundus firm.  Vault empty.

## 2024-09-10 NOTE — PROGRESS NOTE ADULT - SUBJECTIVE AND OBJECTIVE BOX
S: Patient doing well. Minimal lochia. Pain controlled.    O: Vital Signs Last 24 Hrs  T(C): 36.7 (10 Sep 2024 09:45), Max: 37.3 (09 Sep 2024 15:04)  T(F): 98.1 (10 Sep 2024 09:45), Max: 99.1 (09 Sep 2024 15:04)  HR: 76 (10 Sep 2024 09:45) (68 - 138)  BP: 106/59 (10 Sep 2024 09:45) (97/53 - 143/81)  BP(mean): --  RR: 18 (10 Sep 2024 09:45) (15 - 18)  SpO2: 100% (10 Sep 2024 09:45) (88% - 100%)    Parameters below as of 10 Sep 2024 09:45  Patient On (Oxygen Delivery Method): room air        Gen: NAD  Abd: soft, NT, ND, fundus firm below umbilicus  Lochia: moderate  Ext: no tenderness    Labs:                        13.5   13.16 )-----------( 150      ( 09 Sep 2024 16:35 )             40.0       ABO Interpretation: O ( @ 17:10)    Rh Interpretation: Positive ( @ 17:10)    Antibody Screen Negative      A: 29y PPD#1 s/p  doing well.     Plan: Continue routine postpartum care.             Anticipate d/c home.

## 2024-09-11 VITALS
RESPIRATION RATE: 16 BRPM | DIASTOLIC BLOOD PRESSURE: 64 MMHG | OXYGEN SATURATION: 99 % | TEMPERATURE: 98 F | SYSTOLIC BLOOD PRESSURE: 122 MMHG | HEART RATE: 80 BPM

## 2024-09-11 RX ADMIN — ACETAMINOPHEN 975 MILLIGRAM(S): 325 TABLET ORAL at 14:29

## 2024-09-11 RX ADMIN — Medication 600 MILLIGRAM(S): at 00:35

## 2024-09-11 RX ADMIN — Medication 600 MILLIGRAM(S): at 12:00

## 2024-09-11 RX ADMIN — ACETAMINOPHEN 975 MILLIGRAM(S): 325 TABLET ORAL at 15:20

## 2024-09-11 RX ADMIN — ACETAMINOPHEN 975 MILLIGRAM(S): 325 TABLET ORAL at 09:34

## 2024-09-11 RX ADMIN — VITAMIN A, ASCORBIC ACID, VITAMIN D, .ALPHA.-TOCOPHEROL, THIAMINE MONONITRATE, RIBOFLAVIN, NIACIN, PYRIDOXINE HYDROCHLORIDE, FOLIC ACID, CYANOCOBALAMIN, CALCIUM, IRON, MAGNESIUM, ZINC, AND COPPER 1 TABLET(S): 2700; 70; 400; 30; 1.6; 1.8; 18; 2.5; 1; 12; 100; 65; 25; 25; 2 TABLET ORAL at 12:00

## 2024-09-11 RX ADMIN — ACETAMINOPHEN 975 MILLIGRAM(S): 325 TABLET ORAL at 10:30

## 2024-09-11 RX ADMIN — Medication 600 MILLIGRAM(S): at 13:00

## 2024-09-11 RX ADMIN — ACETAMINOPHEN 975 MILLIGRAM(S): 325 TABLET ORAL at 02:28

## 2024-09-11 RX ADMIN — ACETAMINOPHEN 975 MILLIGRAM(S): 325 TABLET ORAL at 03:00

## 2024-09-11 RX ADMIN — SODIUM CHLORIDE 3 MILLILITER(S): 9 INJECTION INTRAMUSCULAR; INTRAVENOUS; SUBCUTANEOUS at 06:00

## 2025-01-22 NOTE — DISCHARGE NOTE OB - SEVERE ABDOMINAL/VAGINAL AND/OR RECTAL PAIN
Your Child's Health             [age]-Year-Old Visit          Jamison Leyva  January 22, 2025    Visit Vitals  Pulse 124   Temp 98.3 °F (36.8 °C) (Temporal)   Resp 32   Ht 37.6\" (95.5 cm)   Wt 13.8 kg (30 lb 5.7 oz)   HC 51.5 cm (20.28\")   BMI 15.10 kg/m²     Weight:       YOUR CHILD'S 1 YEAR-OLD VISIT      NUTRITION: At this age children should be sitting at the table for meals and eating the same as the rest of the family. Just be sure to continue to avoid hard, chunk or chewy foods that could cause choking. Offer a couple of snacks daily in addition to meals since toddlers do not generally eat large portions at a time. Your child should be getting ~16 to 20 ounces of low fat or skim milk daily. A multivitamin with iron is recommended primarily for the iron and vitamin D. Continue to expect your child to like or prefer a limited number of foods, but keep offering a variety, even foods they have already turned down. Don't get upset when they refuse something--just try it again at a later date.     Keep in mind that overweight and obesity are serious problems in our country. As long as you are in charge of what your child is eating, keep it healthy! Avoid starting unhealthy eating habits like sweets, fried fast food, and sweet drinks like soda, juice and Bal-Aid. Sweet drinks are a huge source of unhealthy sugar and calories. Whole fruit is a much healthier choice than juice. If you give your child juice, limit it to no more than 4 ounces a day of 100% juice. Do not water it down in a cup that they can carry around and drink from over an extended period of time; this frequent exposure to the sugars in juice (even if diluted with water) just increases their risk of tooth decay.    Remember, it is always healthier to eat fruit than to drink it!    DENTAL: You should help your child brush their teeth at least twice a day; bedtime brushing is particularly important to their dental health. Use a very small  amount of regular (fluoride) toothpaste. Using city (tap) water to drink and cook with provides important fluoride for strengthening teeth to protect against cavities. If you have well water instead of a city water supply, however, you should have it tested for fluoride content to determine whether your child should receive a fluoride supplement.    DEVELOPMENT & BEHAVIOR: A 1 year old child likes to imitate what you are doing, play simple make-believe games, start to wash and dry their hands and do some dressing and undressing. Your child has likely been learning new words rapidly over the last few months; by age 2, most have at least 50 words and are putting two words together in short sentences. They hopefully have a favorite book (that they can complete the sentences in!) and will name pictures if you point to them. They are more and more active and may seem clumsy sometimes because they are eager to do everything fast. (This is normal!)    Reading books together continues to be one of the best things you can do for your child. Not only will it improve their language skills, but it is a fact that children who love reading and books do better in school than children who were not read to early in life.      Help your child's independence grow by letting them explore new settings and situations (as long as they are safe!). Encourage their attempts at simple tasks and help them learn how to express their feelings like anger, happiness and frustration.     It remains very important to be consistent as you are teaching your child rules and “do’s\" and \"do not's”. When all the family members react to the child's behaviors in the same way, the child will soon learn which behaviors are more likely to earn them praise and smiles (positive attention). When your child misbehaves, say “no” and provide a simple explanation and redirect them to something else. Don’t yell or give a long lecture--remember that your attention is  what your child wants most from you, regardless of whether it is positive or negative. Short time-outs (no more than 2 minutes) at this age can also be effective-- just remember, again, to be consistent (in how you do it and the reasons you do it for).       TELEVISION: The American Academy of Pediatrics recommends no more than one hour a day of television (or any screen time--videos, pads, phones, computers) at this age. As the parent, you should choose high quality, educational programs, and you should watch with your child to help them understand what they are viewing. Avoid letting your child watch programs that adults in the home are watching that have frightening or adult content because this can influence children's behavior and sleep. Also know that young children can be strongly influenced by commercials on TV and will likely start asking for the toys and junk food they see advertised (which is another reason to really limit how much TV they watch!).     TOILET TRAINING:  Signs that your child may be ready for toilet training include: recognizing the urge to go, understanding what that urge means or what they need to do to relieve it, using words to express that they need to go and actually being able to physically get to the toilet and and use it. If you feel your child is ready, encourage their use of a potty chair and give lots of praise when they use it. (If they don't seem interested, don't push it-- wait until they indicate an interest.) Small rewards, like using a sticker or star chart, for successful use of the potty are often effective. Like in so many other aspects of your young child's life, consistency in potty training is important. Go through the same routine each time they use the potty (including hand washing afterwards); children learn more quickly when they learn to predict what comes next.   Remember that most children are not physically ready to be fully potty trained until around age 3  years, so be sure to avoid negative  remarks or punishments when accidents happen-- their little bodies are still developing the control they need to be fully potty trained! Helpful information about potty training can be found on the website of the  American Academy of Pediatrics:HealthyChildren.org - From the American Academy of Pediatrics (search for \"toilet training\").    SAFETY:  1. CAR SAFETY: An approved car seat in the back seat is required by [place] law. Your child is safest in a rear-facing convertible car seat until they reach the top height or weight limit allowed by the car seat . (Some car seats will not specify a height limit, but they recommend that there be one inch between the top of the child’s head and the top of the car seat.) Once they outgrow the size limits for the rear-facing position, turn the seat around and use it until your child reaches the size limits for the forward facing position. (It is very important your child remain in a car seat with the 5-point harness--with straps over both shoulder and both hips--at this age. They are too young for a “booster” seat.)  2. HOME & KITCHEN SAFETY: By now you realize that your busy toddler wants to get into everything! Keep a close eye out at home to make sure medications, toxic substances (cigarettes, alcohol, lighters, cleaning & chemical items) remain safely stored (out of sight, up high and/or locked). Keep knives and other sharp items and anything hot out of reach--curious toddlers will reach for anything interesting that they see! If there is a gun or firearm in the home, make sure it is stored unloaded in a secure locked location separate from the ammunition.   Make sure your smoke and carbon monoxide alarms work and that you have a family fire escape plan. Also, is this number in your cell phone? Call the Poison Center at [phone] for any known or suspected poisoning.  3. OUTDOOR SAFETY: Careful supervision is very  important both indoors and out. Toddlers may be able to open doors, so be extra careful to make sure they don’t get outside without you knowing. When outside they need to be carefully watched near streets and anywhere that cars might be backing up--small children are very difficult to see in rear-view mirrors. Your child should be kept away from moving machinery, including lawn mowers, and ideally kept out of spaces (garages, sheds) where any sharp equipment or toxic chemical products are stored. If your child starts riding a tricycle, have them wear a helmet. (And make sure the whole family is wearing helmets!)  4. PETS: Toddlers need to be carefully watched around pets. Dog bites are common in this age group and are most likely to occur when an unsupervised child is either left alone with a dog or wanders off and approaches a dog--even (especially!) one they know.  (Most dog bites in this age group are from pets of the family or friends.)   5. SUN & WATER SAFETY: Toddlers may love water but they still need to be very carefully watched around it (this means tubs, buckets, wading pools and toilets as well as pools and lakes!).Children can drown in just a couple inches of water, so never leave an infant or toddler alone in a tub. Also, do not rely on older children to properly supervise the younger ones. An adult should always be within arm’s reach whenever a young child is in or around water. Your child is too young for swimming lessons; toddler swim programs, life jackets or “swimmies” will NOT protect your child from drowning! Constant supervision by an adult who knows how to swim is critical. Permanent pools (in ground and above ground) should be fenced off (and locked), and wading pools should be drained when not in use. Keep large buckets empty and keep toilet seat lids down and secured with a safety lock if possible.    SMOKING:  Continue to protect your child from cigarette smoke. Exposure to smoke will  increase their risk of asthma, ear infections, and respiratory infections (pneumonia). If you smoke and are ready to consider quitting, talk to your doctor. Nicotine replacement products can be very helpful in breaking this tough addiction.      LEAD EXPOSURE: [facility] recommends screening children three times in the first 3 years of life (age 2yo, 1yo and 2yo). If you do not live in [place], talk to your doctor about lead screening if any of the following apply: (1) your child currently (or had previously) lives in or frequently visits a house or building built before 1950 (including , grandparent homes, etc); (2) your child currently (or had previously) lives in or frequently visits a house or building built before 1978 with recent or ongoing renovations; (3) your child has a brother, sister or playmate who has had lead poisoning; (4) your child is enrolled in Medicaid or WIC. Very rarely, other sources of lead exposure can include herbal remedies or imported items (mini blinds, canned goods). Do an internet search for “[facility] Lead” for helpful information about lead risks in [place]. If you have questions about older neighborhoods in [place] that might have lead connecting (or service) pipes, do an internet search for \"[place] lead awareness and drinking water safety\". From this web page, you can search for your address to find out if your home was built with lead service lines. There are also helpful hints regarding water safety on this site.    MEDICATION FOR FEVER OR PAIN:   Acetaminophen liquid (e.g., Tylenol or Tempra) may be given every four hours as needed for pain or fever.  Acetaminophen liquid is less concentrated than the infant dropper bottle type.  Be sure to check which product CONCENTRATION you are using.    CHILDREN’S Tylenol/Acetaminophen  (160 MG/5 mL)    Child’s Weight: Dose:  24 - 35 pounds:   160 mg (5.0 mL (1  Teaspoon))    CHILDREN’S Tylenol/Acetaminophen MELTAWAYS ( 80 MG tablets)    Child’s Weight:  Dose:  24 - 35 pounds:    160 mg (2 meltaway tablets)    CHILDREN'S Ibuprofen liquid (100MG/5mL) (e.g., Advil or Motrin) may be given every six hours as needed for pain or fever. Please check the concentration of your ibuprofen to be sure you are giving the correct amount.      Child’s Weight:  Dose:  24 - 35 pounds:    100 mg  (5 mL (1 Teaspoon))    If Jamison is outside this weight range, call your pediatrician’s office for advice.    Most Recent Immunizations   Administered Date(s) Administered    DTaP 04/15/2024    DTaP/Hep B/IPV 2023    Hep A, ped/adol, 2 dose 07/15/2024    Hep B, adolescent or pediatric 2023    Hib (PRP-OMP) 04/15/2024    MMR 01/15/2024    Pneumococcal conjugate PCV 13 2023    Pneumococcal conjugate PCV20 04/15/2024    Rotavirus - pentavalent 2023    Varicella 01/15/2024       If Jamison develops any of the following reactions within 72 hours after an immunization, notify your pediatrician by calling the pediatric phone nurse:  A temperature of 105 degrees or above.  More than 3 hours of continuous crying.  A shrill, high-pitched cry.  A pale, limp spell.  A seizure or fainting spell.  In this case, you should call 911 or go immediately to the emergency room.    NEXT VISIT:  2 1/2 YEARS OF AGE    Thank you for entrusting your care to [facility].    Also, check out “Children’s Health” on the [facility] Blog for updates on timely topics regarding children’s health!   Statement Selected

## 2025-04-30 ENCOUNTER — RESULT REVIEW (OUTPATIENT)
Age: 30
End: 2025-04-30